# Patient Record
Sex: MALE | Race: WHITE | URBAN - METROPOLITAN AREA
[De-identification: names, ages, dates, MRNs, and addresses within clinical notes are randomized per-mention and may not be internally consistent; named-entity substitution may affect disease eponyms.]

---
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
## 2025-07-08 ENCOUNTER — TELEPHONE (OUTPATIENT)
Dept: BEHAVIORAL HEALTH | Facility: CLINIC | Age: 20
End: 2025-07-08

## 2025-07-08 ENCOUNTER — HOSPITAL ENCOUNTER (EMERGENCY)
Facility: CLINIC | Age: 20
Discharge: HOME | End: 2025-07-08
Attending: EMERGENCY MEDICINE

## 2025-07-08 VITALS
RESPIRATION RATE: 18 BRPM | HEART RATE: 75 BPM | SYSTOLIC BLOOD PRESSURE: 126 MMHG | DIASTOLIC BLOOD PRESSURE: 84 MMHG | HEIGHT: 76 IN | BODY MASS INDEX: 38.36 KG/M2 | WEIGHT: 315 LBS | TEMPERATURE: 98 F | OXYGEN SATURATION: 97 %

## 2025-07-08 DIAGNOSIS — R45.851 SUICIDAL IDEATION: ICD-10-CM

## 2025-07-08 PROBLEM — F32.9 MAJOR DEPRESSION: Status: ACTIVE | Noted: 2025-07-08

## 2025-07-08 PROCEDURE — 99282 EMERGENCY DEPT VISIT SF MDM: CPT | Performed by: EMERGENCY MEDICINE

## 2025-07-08 PROCEDURE — 99283 EMERGENCY DEPT VISIT LOW MDM: CPT | Performed by: EMERGENCY MEDICINE

## 2025-07-08 RX ORDER — HYDROXYZINE HYDROCHLORIDE 25 MG/1
25 TABLET, FILM COATED ORAL EVERY 4 HOURS PRN
Status: DISCONTINUED | OUTPATIENT
Start: 2025-07-08 | End: 2025-07-08 | Stop reason: HOSPADM

## 2025-07-08 RX ORDER — OLANZAPINE 5 MG/1
10 TABLET, ORALLY DISINTEGRATING ORAL 3 TIMES DAILY PRN
Status: DISCONTINUED | OUTPATIENT
Start: 2025-07-08 | End: 2025-07-08 | Stop reason: HOSPADM

## 2025-07-08 ASSESSMENT — COLUMBIA-SUICIDE SEVERITY RATING SCALE - C-SSRS
2. HAVE YOU ACTUALLY HAD ANY THOUGHTS OF KILLING YOURSELF IN THE PAST MONTH?: YES
5. HAVE YOU STARTED TO WORK OUT OR WORKED OUT THE DETAILS OF HOW TO KILL YOURSELF? DO YOU INTEND TO CARRY OUT THIS PLAN?: NO
1. IN THE PAST MONTH, HAVE YOU WISHED YOU WERE DEAD OR WISHED YOU COULD GO TO SLEEP AND NOT WAKE UP?: YES
3. HAVE YOU BEEN THINKING ABOUT HOW YOU MIGHT KILL YOURSELF?: NO
6. HAVE YOU EVER DONE ANYTHING, STARTED TO DO ANYTHING, OR PREPARED TO DO ANYTHING TO END YOUR LIFE?: YES
4. HAVE YOU HAD THESE THOUGHTS AND HAD SOME INTENTION OF ACTING ON THEM?: NO

## 2025-07-08 ASSESSMENT — ACTIVITIES OF DAILY LIVING (ADL)
ADLS_ACUITY_SCORE: 41

## 2025-07-08 NOTE — ED TRIAGE NOTES
Here with father for having suicidal thoughts this AM. States this has happened prior, approx 1 year ago. Denies current plan. Previous suicide attempt approx 1.5 years ago. Is not currently on medications or receiving therapy.

## 2025-07-08 NOTE — CONSULTS
Diagnostic Evaluation Consultation  Crisis Assessment    Patient Name: Derek Julio  Age:  19 year old  Legal Sex: male  Gender Identity: male  Pronouns:  he / him    Race: White  Ethnicity: Not  or   Language: English    Patient was assessed: Virtual: [employer]   Crisis Assessment Start Date: 07/08/25  Crisis Assessment Start Time: 0805  Crisis Assessment Stop Time: 0852  Patient location: [facility] Emergency Dept                               Referral Data and Chief Complaint  Derek Julio presents to the ED with family/friends. Patient is presenting to the ED for the following concerns: Suicidal ideation. Factors that make the mental health crisis life threatening or complex are: Pt presents to ED with father due to having suicidal ideation. Pt states he has let a lot of stuff pile up, doesn't talk to anyone about his problems as he tries to deal with it by himself. Pt feels he has pushed himself over the edge. Pt left his apartment and felt like ending his life. [place] go, then sent a text to his mom and dad stating he was sorry for being a disappointment. Pt's dad texted back immediately and called him. Pt states he couldn't bring himself to complete suicide. Pt's dad went to where he was and brought him to the ED. Pt states he had no plan of how he would end his life when he was driving to [employer] this morning. Pt states he thought he would try to have a nice day and see what would happen. Pt later states he had thoughts that maybe he would crash his car and had thoughts about that. Pt states he has no active thoughts, intent, plans to harm himself. Pt states he feels he needs outpatient help currently to get the help he needs. He is willing to try to open up to a professional about his problems to work through them. He is not currently connected with insurance and is going to work on this today with his father. Pt reports he attempted suicide a  "year ago, tying up a noose, put it around his neck and aborted, throwing it away. Pt states he moved in with his mother for a little bit and remained isolated and didn't really talk about his problems. Pt then states things went back to where he was \"the same rabbit hole I was at before\". Pt attended therapy for a couple weeks and stopped attending 5-6 years ago, attending only a couple sessions. Pt denies taking any medication as he tries to stay away from medication because he knew people who got addicted. He denies drinking alcohol or using any substances. Pt identifies his stressors as not talking about his feelings and letting it out. Pt reports he feels he is safe for discharge and can be safe discharging home with his father.    Informed Consent and Assessment Methods  Explained the crisis assessment process, including applicable information disclosures and limits to confidentiality, assessed understanding of the process, and obtained consent to proceed with the assessment.  Assessment methods included conducting a formal interview with patient, review of medical records, collaboration with medical staff, and obtaining relevant collateral information from family and community providers when available.  : done     History of the Crisis   Pt denied any previous dx for mental health. Pt attended therapy 2 sessions and stopped going 5-6 years ago. Pt has never taken medications for his mental health and states he doesn't like to take medications in general because he is afraid of getting addicted. Pt denied any previous IP MH placement, day treatment, PHP, substance use treatment, or detox admissions. He is open to getting insurance, then accessing services for therapy.    Brief Psychosocial History  Family:  Single, Children no  Support System:  Parent(s), Grandparent(s) (My whole family.)  Employment Status:  unemployed  Source of Income:  none  Financial Environmental Concerns:  unemployed  Current Hobbies:  " "other (see comments), music, interaction with pets, outdoor activities, travel, puzzles, games (Playing [employer], fishing.)  Barriers in Personal Life:  lack of motivation, financial concerns    Significant Clinical History  Current Anxiety Symptoms:   (None endorsed)  Current Depression/Trauma:  low self esteem, crying or feels like crying, thoughts of death/suicide, sadness, excessive guilt, difficulty concentrating, apathy  Current Somatic Symptoms:   (None endorsed)  Current Psychosis/Thought Disturbance:  anger, agitation, impulsive  Current Eating Symptoms:   (None endorsed)  Chemical Use History:  Alcohol: None  Benzodiazepines: None  Opiates: None  Cocaine: None  Marijuana: None  Other Use: None  Withdrawal Symptoms:  (None endorsed)  Addictions:  (None endorsed)   Past diagnosis:  No known past diagnosis, Suicide attempt(s)  Family history:  No known history of mental health or chemical health concerns  Past treatment:  Individual therapy  Details of most recent treatment:  Pt last attended 2 sessions of therapy and quiting about 5-6 years ago.  Other relevant history:  Pt denied any legal issues.    Have there been any medication changes in the past two weeks:  patient is not on psychiatric meds       Is the patient compliant with medications:   (NA)        Collateral Information  Is there collateral information: Yes     Collateral information name, relationship, phone number:  Dixon [phone]    What happened today: Father got a text at 4am, \"very concerning that he loved me, sorry for being a disappointment\". This happened a little more than a year ago, where he got a phone call in the middle of the night where he was in a parking lot thinking of hanging himself. Dixon touched base with him and came up with a plan.     What is different about patient's functioning: He can tell when things are not going right, pt has not been totally honset with him. Pt is trying very hard to do things on his own, and " "keeps things close to his chest. Dixon talked to his wife, might have an addiction to brigitte, lost his job, stayed up late, socially isolates and this is what might be happening now.     Has patient made comments about wanting to kill themselves/others: yes    If d/c is recommended, can they take part in safety/aftercare planning: yes     Risk Assessment  [place]ers Suicide Severity Rating Scale Full Clinical Version:  Suicidal Ideation  Q1 Wish to be Dead (Lifetime): Yes  Q2 Non-Specific Active Suicidal Thoughts (Lifetime): Yes  3. Active Suicidal Ideation with any Methods (Not Plan) Without Intent to Act (Lifetime): Yes  4. Active Suicidal Ideation with Some Intent to Act, Without Specific Plan (Lifetime): Yes  5. Active Suicidal Ideation with Specific Plan and Intent (Lifetime): Yes  Q6 Suicide Behavior (Lifetime): yes  Intensity of Ideation (Lifetime)  Most Severe Ideation Rating (Lifetime): 4  Frequency (Lifetime):  (\"Once a year if that\")  Duration (Lifetime): 4-8 hours/most of day  Controllability (Lifetime): Can control thoughts with some difficulty  Deterrents (Lifetime): Deterrents definitely stopped you from attempting suicide  Reasons for Ideation (Lifetime): Does not apply  Suicidal Behavior (Lifetime)  Actual Attempt (Lifetime): No  Has subject engaged in non-suicidal self-injurious behavior? (Lifetime): No  Interrupted Attempts (Lifetime): No  Aborted or Self-Interrupted Attempt (Lifetime): Yes  Total Number of Aborted or Self-Interrupted Attempts (Lifetime): 1  Aborted or Self-Interrupted Attempt Description (Lifetime): Put a noose around his neck to hang himself, he stopped himself and threw it away, in January 2024  Preparatory Acts or Behavior (Lifetime): No    Prowers Suicide Severity Rating Scale Recent:   Suicidal Ideation (Recent)  Q1 Wished to be Dead (Past Month): yes  Q2 Suicidal Thoughts (Past Month): yes  Q3 Suicidal Thought Method: yes  Q4 Suicidal Intent without Specific Plan: no  Q5 " Suicide Intent with Specific Plan: no  If yes to Q6, within past 3 months?: no  Level of Risk per Screen: moderate risk  Intensity of Ideation (Recent)  Most Severe Ideation Rating (Past 1 Month): 3  Description of Most Severe Ideation (Past 1 Month): 3 in past month, currently 1  Frequency (Past 1 Month): Daily or almost daily  Duration (Past 1 Month): Less than 1 hour/some of the time  Controllability (Past 1 Month): Can control thoughts with little difficulty  Deterrents (Past 1 Month): Deterrents definitely stopped you from attempting suicide  Reasons for Ideation (Past 1 Month): Does not apply  Suicidal Behavior (Recent)  Actual Attempt (Past 3 Months): No  Total Number of Actual Attempts (Past 3 Months): 0  Has subject engaged in non-suicidal self-injurious behavior? (Past 3 Months): No  Interrupted Attempts (Past 3 Months): No  Total Number of Interrupted Attempts (Past 3 Months): 0  Aborted or Self-Interrupted Attempt (Past 3 Months): No  Total Number of Aborted or Self-Interrupted Attempts (Past 3 Months): 0  Preparatory Acts or Behavior (Past 3 Months): No  Total Number of Preparatory Acts (Past 3 Months): 0    Environmental or Psychosocial Events: excessive debt, poor finances, unstable housing, homelessness, unemployment/underemployment  Protective Factors: Protective Factors: strong bond to family unit, community support, or employment, sense of belonging, help seeking, lives in a responsibly safe and stable environment, constructive use of leisure time, enjoyable activities, resilience, reality testing ability    Does the patient have thoughts of harming others? Feels Like Hurting Others: no  Previous Attempt to Hurt Others: no  Current presentation:  (None endorsed, pt is calm and cooperative.)  Is the patient engaging in sexually inappropriate behavior?: no  Does Patient have a known history of aggressive behavior: No  Has aggression occurred as a result of MH concerns/diagnosis: None endorsed  Does  patient have history of aggression in hospital: None endorsed    Is the patient engaging in sexually inappropriate behavior?  no        Mental Status Exam   Affect: Appropriate  Appearance: Appropriate  Attention Span/Concentration: Attentive  Eye Contact: Engaged    Fund of Knowledge: Appropriate   Language /Speech Content: Fluent  Language /Speech Volume: Normal  Language /Speech Rate/Productions: Normal  Recent Memory: Intact  Remote Memory: Intact  Mood: Sad, Normal  Orientation to Person: Yes   Orientation to Place: Yes  Orientation to Time of Day: Yes  Orientation to Date: Yes     Situation (Do they understand why they are here?): Yes  Psychomotor Behavior: Normal  Thought Content: Clear, Other (please comment) (Suicidal ideation earlier, no active thoughts, intent or plans to harm himself currently.)  Thought Form: Intact, Goal Directed     Medication  Psychotropic medications:   Medication Orders - Psychiatric (From admission, onward)      Start     Dose/Rate Route Frequency Ordered Stop    07/08/25 0709  OLANZapine zydis (zyPREXA) ODT tab 10 mg         10 mg Oral 3 TIMES DAILY PRN 07/08/25 0709      07/08/25 0708  hydrOXYzine HCl (ATARAX) tablet 25 mg         25 mg Oral EVERY 4 HOURS PRN 07/08/25 0709          Current Care Team  Patient Care Team:  No Ref-Primary, Physician as PCP - General    Diagnosis  Patient Active Problem List   Diagnosis Code    Major depression F32.9     Primary Problem This Admission  Active Hospital Problems  F32.9  Major depression    Clinical Summary and Substantiation of Recommendations   Clinical Substantiation:  After therapeutic assessment, intervention, and aftercare planning by ED care team and LM and in consultation with attending provider, the patient's circumstances and mental state were appropriate for outpatient management. It is the recommendation of this clinician that pt discharge with OP MH support. Currently the pt is not presenting as an acute risk to self or  others due to the following factors: Pt reprots have suicidal thoughts earlier today and reached out to his parents for support. Pt is willing to seek outpatient support currently, through therapy for now. Pt is willing to enroll in [email or web address] today. Pt will consider his options for medication management. Pt developed a safety plan and would like to discharge home to his father's place.    Goals for crisis stabilization:  Pt feels he is safe to discharge currently. Pt plans to stay with his father until he is more stable. Pt will try to get connect with insurance and apply today. Pt's father will help the pt get connected to services as well.    Next steps for Care Team:  Please print safety plan for pt prior to discharge.    Treatment Objectives Addressed:  rapport building, safety planning, identifying an appropriate aftercare plan, identifying additional supports, assessing safety    Therapeutic Interventions:  Engaged in safety planning, Reviewed healthy living that supports positive mental health, including looking at sleep hygiene, regular movement, nutrition, and regular socialization., Identified and practiced coping skills.    Has a specific means been identified for suicidal/homicide actions: No    Patient coping skills attempted to reduce the crisis:  Pt likes to play video games, listen to music, talk to his family, hike, and go to [place].    Disposition  Recommended referrals: Individual Therapy, Medication Management        Reviewed case and recommendations with attending provider. Attending Name: Dr Catherine       Attending concurs with disposition: yes       Patient and/or validated legal guardian concurs with disposition: yes       Final disposition:  discharge      Legal status: Voluntary/Patient has signed consent for treatment                                                                           Reviewed court records: yes     Assessment Details   Total duration spent with the patient: 47 min      CPT code(s) utilized: 34981 - Psychotherapy for Crisis - 60 (30-74*) min    Jing Rowley [facility], Psychotherapist  DEC - Triage & Transition Services  Callback: [phone]

## 2025-07-08 NOTE — PLAN OF CARE
Derek Julio  July 8, 2025  Plan of Care Hand-off Note     Patient Recommended Care Path: discharge    Clinical Substantiation:  After therapeutic assessment, intervention, and aftercare planning by ED care team and LM and in consultation with attending provider, the patient's circumstances and mental state were appropriate for outpatient management. It is the recommendation of this clinician that pt discharge with OP MH support. Currently the pt is not presenting as an acute risk to self or others due to the following factors: Pt reprots have suicidal thoughts earlier today and reached out to his parents for support. Pt is willing to seek outpatient support currently, through therapy for now. Pt is willing to enroll in [email or web address] today. Pt will consider his options for medication management. Pt developed a safety plan and would like to discharge home to his father's place.    Goals for crisis stabilization:  Pt feels he is safe to discharge currently. Pt plans to stay with his father until he is more stable. Pt will try to get connect with insurance and apply today. Pt's father will help the pt get connected to services as well.    Next steps for Care Team:  Please print safety plan for pt prior to discharge.    Treatment Objectives Addressed:  rapport building, safety planning, identifying an appropriate aftercare plan, identifying additional supports, assessing safety    Therapeutic Interventions:  Engaged in safety planning, Reviewed healthy living that supports positive mental health, including looking at sleep hygiene, regular movement, nutrition, and regular socialization., Identified and practiced coping skills.    Has a specific means been identified for suicidal.homicide actions: No    Patient coping skills attempted to reduce the crisis:  Pt likes to play video games, listen to music, talk to his family, hike, and go to [place].    Collateral contact information:  Dixon [phone]    Legal Status:  Voluntary/Patient has signed consent for treatment                                                Reviewed court records: yes     Psychiatry Consult: Not at this time    Jing Rowley, [facility]

## 2025-07-08 NOTE — DISCHARGE INSTRUCTIONS
Willie are resources for therapy, medication management, and MNsure navigators.     Therapy   Provider: Dian Crespo  Location: [facility], [facility], [address], [address], [place] [address]  Phone: [phone]    Provider: Alberto Phipps MA  Location: [facility], [address], [place] [address]  Phone: [phone]  Type: Therapy - (In-Person)    Provider: Daisy Ybarra  MSW  LICSW  Location: [facility] [employer] [address], [address], [place] [address]  Phone: [phone]  Type: Therapy - (In-Person)    Medication Management    Provider: Santa Barton MS  CNP,PMHNP,RN  Location: [facility], [address], [address] [address]  Phone: [phone]  Type: Medication Mgmt - (In-Person)    Christiano Jolley MD, MD  Medication Management   [facility]   [address], [address] [place]  [phone]    Toby Gill DNP  CNP,PMGRACYP,RN   Medication Management   [facility] [facility] [employer]  [address], [address] [place]  [phone]    MNSure Navigators:     Name: Noy Kaur  Organization: [employer]  Address: [address] [place] [id number] Assister [phone]    Name: [facility]  Address: [address] [place] [id number] Assister   Type: [email or web address]  [phone]   Languages  Can help Remotely (By Phone Or Online)YesCan Help In Person (In [facility])[place]ike [place], [place]

## 2025-07-08 NOTE — ED PROVIDER NOTES
"  Emergency Department Note      History of Present Illness     Chief Complaint   Suicidal      HPI   Derek Julio is a 19 year old male who presents to the emergency department for evaluation of suicidal. The patient reports having suicidal thoughts this morning on 7/8/25. He states that he would like some help and has thoughts about suicide being the only choice. He has had a previous suicide attempt a year ago, with similar thoughts and attempt. The patient denies taking any medications. He denies drinking alcohol. He denies seeing a therapist and doctor. There was been stressful events happening in his life currently, including personal relationships, that makes him not want to \"stick around.\"       None    Review of External Notes   9/16/16: ED note reviewed    Past Medical History     Medical History and Problem List   History reviewed. No significant past medical history.      Medications   The patient is currently on no regular medications.          Physical Exam     Patient Vitals for the past 24 hrs:   BP Temp Temp src Pulse Resp SpO2 Height Weight   07/08/25 0927 126/84 98  F (36.7  C) Oral 75 18 97 % -- --   07/08/25 0657 (!) 149/96 98.3  F (36.8  C) Temporal 98 18 100 % -- --   07/08/25 0654 -- -- -- -- -- -- 1.93 m (6' 4\") (!) 148.3 kg (326 lb 15.1 oz)     Physical Exam  General: No acute distress.  Head: No obvious trauma to head.  Eyes:  Conjunctivae clear.   Neck: Normal range of motion.   CV: Skin is well perfused, no cyanosis  Respiratory: Effort normal. No audible wheezing.  Gastrointestinal: Non-distended.  Musculoskeletal: Normal range of motion. No gross deformities.  Neuro: Alert. Moving all extremities appropriately.  Normal speech.    Skin: No rashes or lesions on exposed skin.  Psych: Past suicidal ideation. Flab affect.     Diagnostics     Lab Results   Labs Ordered and Resulted from Time of ED Arrival to Time of ED Departure - No data to display    Imaging   No orders to display "       Independent Interpretation   None    ED Course      Medications Administered   Medications - No data to display    Procedures   Procedures     Discussion of Management   None    ED Course   ED Course as of 07/08/25 1156   Tue Jul 08, 2025   0703 I obtained history and examined the patient as noted above     0914 I spoke with the behavioral health clinician to discuss patient plan of care.    0914 We discussed plans for discharge and the patient is agreeable with this plan.        Additional Documentation  None    Medical Decision Making / Diagnosis     CMS Diagnoses: None    MIPS   None               MDM   Derek Julio is a 19 year old male presenting with suicidal ideation.  He takes no medication and does not see an outpatient provider.  He is calm and cooperative and is interested in talking to the DEC .  DEC speaks to the patient and the patient's father.  The patient denies current suicidal ideation.  It appears he has lost his job and housing, but his father is able to let him stay at his home.  The patient is agreeable with this plan and DEC is able to give outpatient resources.  Patient is able to safety plan.  He feels comfortable discharging home as well as his father.  Return precautions are given and the patient verbalizes understanding.  He is discharged home in stable condition.    Disposition   The patient was discharged.    Diagnosis     ICD-10-CM    1. Suicidal ideation  R45.851            Discharge Medications   There are no discharge medications for this patient.        Scribe Disclosure:  I, Maria Elena Renee, am serving as a scribe at 7:14 AM on 7/8/2025 to document services personally performed by Rupert Catherine MD based on my observations and the provider's statements to me.        Rupert Catherine MD  07/08/25 1130